# Patient Record
Sex: MALE | Race: WHITE | NOT HISPANIC OR LATINO | Employment: UNEMPLOYED | URBAN - METROPOLITAN AREA
[De-identification: names, ages, dates, MRNs, and addresses within clinical notes are randomized per-mention and may not be internally consistent; named-entity substitution may affect disease eponyms.]

---

## 2017-01-31 PROBLEM — R45.89 SUICIDAL BEHAVIOR: Status: ACTIVE | Noted: 2017-01-31

## 2017-02-13 PROBLEM — R45.89 SUICIDAL BEHAVIOR WITHOUT ATTEMPTED SELF-INJURY: Status: ACTIVE | Noted: 2017-02-13

## 2020-01-24 PROBLEM — S42.309A HUMERUS FRACTURE: Status: ACTIVE | Noted: 2020-01-24

## 2020-01-24 PROBLEM — G93.89 PNEUMOCEPHALUS: Status: ACTIVE | Noted: 2020-01-24
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
## 2020-01-24 PROBLEM — J96.01 ACUTE HYPOXEMIC RESPIRATORY FAILURE: Status: ACTIVE | Noted: 2020-01-24

## 2020-01-24 PROBLEM — S01.81XA FACIAL LACERATION: Status: ACTIVE | Noted: 2020-01-24

## 2020-01-24 PROBLEM — S02.413A: Status: ACTIVE | Noted: 2020-01-24

## 2020-01-24 PROBLEM — S02.412B: Status: ACTIVE | Noted: 2020-01-24

## 2020-01-24 PROBLEM — S42.411B OPEN SUPRACONDYLAR FRACTURE OF RIGHT HUMERUS: Status: ACTIVE | Noted: 2020-01-24

## 2020-01-27 PROBLEM — S02.621A: Status: ACTIVE | Noted: 2020-01-27

## 2020-01-27 PROBLEM — S02.602B: Status: ACTIVE | Noted: 2020-01-27

## 2020-01-27 PROBLEM — S02.411B: Status: ACTIVE | Noted: 2020-01-24

## 2020-01-27 PROBLEM — S02.40EB: Status: ACTIVE | Noted: 2020-01-27

## 2020-01-30 PROBLEM — R50.9 FEVER: Status: ACTIVE | Noted: 2020-01-30

## 2020-01-30 PROBLEM — D72.829 LEUKOCYTOSIS: Status: ACTIVE | Noted: 2020-01-30

## 2020-01-30 PROBLEM — D72.825 BANDEMIA: Status: ACTIVE | Noted: 2020-01-30

## 2020-01-30 PROBLEM — I26.99 ACUTE PULMONARY EMBOLISM WITHOUT ACUTE COR PULMONALE: Status: ACTIVE | Noted: 2020-01-30

## 2020-02-01 PROBLEM — S02.621D: Status: ACTIVE | Noted: 2020-01-27

## 2020-02-01 PROBLEM — S42.302A FRACTURE OF LEFT HUMERUS: Status: ACTIVE | Noted: 2020-01-24

## 2020-02-01 PROBLEM — R45.89 SUICIDAL BEHAVIOR WITHOUT ATTEMPTED SELF-INJURY: Status: RESOLVED | Noted: 2017-02-13 | Resolved: 2020-02-01

## 2020-02-01 PROBLEM — D72.829 LEUKOCYTOSIS: Status: RESOLVED | Noted: 2020-01-30 | Resolved: 2020-02-01

## 2020-02-01 PROBLEM — F10.21 H/O ALCOHOL DEPENDENCE: Chronic | Status: ACTIVE | Noted: 2020-02-01

## 2020-02-01 PROBLEM — J96.01 ACUTE HYPOXEMIC RESPIRATORY FAILURE: Status: RESOLVED | Noted: 2020-01-24 | Resolved: 2020-02-01

## 2020-02-01 PROBLEM — R45.89 SUICIDAL BEHAVIOR: Status: RESOLVED | Noted: 2017-01-31 | Resolved: 2020-02-01

## 2020-02-02 PROBLEM — M79.671 RIGHT FOOT PAIN: Status: ACTIVE | Noted: 2020-02-02

## 2020-02-03 PROBLEM — M79.672 LEFT FOOT PAIN: Status: ACTIVE | Noted: 2020-02-02

## 2020-02-04 PROBLEM — M79.672 LEFT FOOT PAIN: Status: RESOLVED | Noted: 2020-02-02 | Resolved: 2020-02-04

## 2020-02-04 PROBLEM — S92.415A CLOSED NONDISPLACED FRACTURE OF PROXIMAL PHALANX OF LEFT GREAT TOE: Status: ACTIVE | Noted: 2020-02-04

## 2020-02-04 PROBLEM — S92.912A: Status: ACTIVE | Noted: 2020-02-02

## 2020-02-04 PROBLEM — S92.912A: Status: RESOLVED | Noted: 2020-02-02 | Resolved: 2020-02-04

## 2020-02-18 ENCOUNTER — NURSE TRIAGE (OUTPATIENT)
Dept: ADMINISTRATIVE | Facility: CLINIC | Age: 35
End: 2020-02-18

## 2020-02-19 NOTE — TELEPHONE ENCOUNTER
Pt's wife calling, states pt had sx two weeks ago and today has temp of 102.3. Per triage protocol, advised that pt go to ED within 4 hours based on his symptoms. Towards end of encounter, caller states pt is currently projectile vomiting, so I advised that they go sooner. She verbalized understanding.    Reason for Disposition   [1] Fever > 100.0 F (37.8 C) AND [2] surgery in the last month    Additional Information   Negative: Shock suspected (e.g., cold/pale/clammy skin, too weak to stand, low BP, rapid pulse)   Negative: Difficult to awaken or acting confused (e.g., disoriented, slurred speech)   Negative: [1] Difficulty breathing AND [2] bluish lips, tongue or face   Negative: New onset rash with multiple purple (or blood-colored) spots or dots   Negative: Sounds like a life-threatening emergency to the triager   Negative: Fever in a cancer patient who is currently (or recently) receiving chemotherapy or radiation therapy, or cancer patient who has metastatic or end-stage cancer and is receiving palliative care   Negative: Pregnant   Negative: Fever onset within 24 hours of receiving vaccine   Negative: [1] Fever AND [2] within 21 days of Ebola EXPOSURE   Negative: Other symptom is present, see that guideline  (e.g., symptoms of cough, runny nose, sore throat, earache, abdominal pain, diarrhea, vomiting)   Negative: [1] Headache AND [2] stiff neck (can't touch chin to chest)   Negative: Difficulty breathing   Negative: IV drug abuse   Negative: [1] Drinking very little AND [2] dehydration suspected (e.g., no urine > 12 hours, very dry mouth, very lightheaded)   Negative: Patient sounds very sick or weak to the triager  (Exception: mild weakness and hasn't taken fever medicine)   Negative: Fever > 104 F (40 C)   Negative: [1] Fever > 101 F (38.3 C) AND [2] age > 60   Negative: [1] Fever > 100.0 F (37.8 C) AND [2] bedridden (e.g., nursing home patient, CVA, chronic illness, recovering from  surgery)   Negative: [1] Fever > 100.0 F (37.8 C) AND [2] indwelling urinary catheter (e.g., Wilde, Coude)   Negative: [1] Fever > 100.0 F (37.8 C) AND [2] has port (portacath), central line, or PICC line   Negative: [1] Fever > 100.0 F (37.8 C) AND [2] diabetes mellitus or weak immune system (e.g., HIV positive, cancer chemo, splenectomy, chronic steroids)    Protocols used: FEVER-A-AH

## 2020-02-24 PROBLEM — S42.252D CLOSED DISPLACED FRACTURE OF GREATER TUBEROSITY OF LEFT HUMERUS WITH ROUTINE HEALING: Status: ACTIVE | Noted: 2020-02-24

## 2021-05-12 ENCOUNTER — PATIENT MESSAGE (OUTPATIENT)
Dept: RESEARCH | Facility: HOSPITAL | Age: 36
End: 2021-05-12